# Patient Record
Sex: FEMALE | Race: WHITE | NOT HISPANIC OR LATINO | URBAN - METROPOLITAN AREA
[De-identification: names, ages, dates, MRNs, and addresses within clinical notes are randomized per-mention and may not be internally consistent; named-entity substitution may affect disease eponyms.]

---

## 2024-09-28 ENCOUNTER — OFFICE VISIT (OUTPATIENT)
Dept: URGENT CARE | Age: 24
End: 2024-09-28

## 2024-09-28 VITALS
TEMPERATURE: 98.5 F | DIASTOLIC BLOOD PRESSURE: 71 MMHG | SYSTOLIC BLOOD PRESSURE: 98 MMHG | RESPIRATION RATE: 18 BRPM | HEART RATE: 105 BPM | WEIGHT: 170 LBS | OXYGEN SATURATION: 98 %

## 2024-09-28 DIAGNOSIS — H60.501 ACUTE OTITIS EXTERNA OF RIGHT EAR, UNSPECIFIED TYPE: Primary | ICD-10-CM

## 2024-09-28 RX ORDER — DEXTROAMPHETAMINE SACCHARATE, AMPHETAMINE ASPARTATE MONOHYDRATE, DEXTROAMPHETAMINE SULFATE AND AMPHETAMINE SULFATE 2.5; 2.5; 2.5; 2.5 MG/1; MG/1; MG/1; MG/1
10-20 CAPSULE, EXTENDED RELEASE ORAL
COMMUNITY
Start: 2024-07-25

## 2024-09-28 RX ORDER — ONDANSETRON 8 MG/1
8 TABLET, ORALLY DISINTEGRATING ORAL ONCE
Status: COMPLETED | OUTPATIENT
Start: 2024-09-28 | End: 2024-09-28

## 2024-09-28 RX ORDER — TRAZODONE HYDROCHLORIDE 100 MG/1
TABLET ORAL
COMMUNITY

## 2024-09-28 RX ORDER — SUMATRIPTAN SUCCINATE 100 MG/1
TABLET ORAL
COMMUNITY
Start: 2018-01-30

## 2024-09-28 RX ORDER — KETOROLAC TROMETHAMINE 30 MG/ML
30 INJECTION, SOLUTION INTRAMUSCULAR; INTRAVENOUS ONCE
Status: COMPLETED | OUTPATIENT
Start: 2024-09-28 | End: 2024-09-28

## 2024-09-28 RX ORDER — GABAPENTIN 300 MG/1
CAPSULE ORAL
COMMUNITY

## 2024-09-28 RX ORDER — DULOXETIN HYDROCHLORIDE 30 MG/1
1 CAPSULE, DELAYED RELEASE ORAL
COMMUNITY
Start: 2024-01-21

## 2024-09-28 NOTE — PROGRESS NOTES
Subjective   Patient ID: Peggy Dozier is a 24 y.o. female. They present today with a chief complaint of Earache (Right ear).    History of Present Illness  HPI  Patient is a 24-year-old female who presents with an earache.  She has had an earache on the right side for several week.  She was put on eardrops and antibiotics yesterday.  On day of eardrops she now has worsening pain swelling so anxiety symptoms that are a side effect.  She is nauseated.  Mom brought her in To be seen.  Past Medical History  Allergies as of 09/28/2024 - never reviewed   Allergen Reaction Noted    Sulfamethoxazole-trimethoprim Hives and Rash 02/05/2018       (Not in a hospital admission)       Past Medical History:   Diagnosis Date    Acute suppurative otitis media without spontaneous rupture of ear drum, left ear 09/20/2016    Acute exudative otitis media, left    Anxiety disorder, unspecified     Anxiety    Encounter for insertion of intrauterine contraceptive device 02/25/2019    Encounter for insertion of mirena IUD    Encounter for other general counseling and advice on contraception 01/15/2019    Birth control counseling    Encounter for screening for infections with a predominantly sexual mode of transmission 03/28/2018    Routine screening for STI (sexually transmitted infection)    Encounter for surveillance of implantable subdermal contraceptive 02/25/2019    Implanon removal    Other conditions influencing health status     Nulliparity    Personal history of other diseases of the nervous system and sense organs     History of hearing loss    Personal history of other mental and behavioral disorders     History of attention deficit disorder    Superficial mycosis, unspecified 10/13/2016    Otitis externa, fungal, left ear       Past Surgical History:   Procedure Laterality Date    OTHER SURGICAL HISTORY  01/15/2019    Tonsillectomy    TONSILLECTOMY  03/31/2016    Tonsillectomy            Review of Systems  Review of Systems  Gen:  No fatigue, fever, sweats.  Head: No headache, trauma.  Eyes: No vision loss, double vision, drainage, eye pain.  ENT: No hearing changes, + ear pain, epistaxis, congestion  Cardiac: No chest pain  Pulmonary: No shortness of breath,  pleuritic pain,   Heme/lymph: No swollen glands  GI: No abdominal pain, +nausea, no vomiting, diarrhea  : No  dysuria, frequency, urgency, hematuria  Musculoskeletal: No limb pain, joint pain, back pain, joint swelling or stiffness.  Skin: No rashes, pruritus, lumps, lesions.  Neuro: No Numbness, tingling, or weakness.  Psych: No  anxiety     Review of systems is otherwise negative unless stated above or in history of present illness.                             Objective    Vitals:    09/28/24 0845   BP: 98/71   Pulse: 105   Resp: 18   Temp: 36.9 °C (98.5 °F)   SpO2: 98%   Weight: 77.1 kg (170 lb)     No LMP recorded.    Physical Exam  General: Vital signs stable, Pt is alert, no acute distress  Eyes: Conjunctiva normal, PERRL, EOMs intact  HENMT: Normocephalic, atraumatic, nose normal, no scars or masses.  No mastoid tenderness. Trachea is midline. No meningeal signs, negative Kernig and Brudzinski, moves neck freely.  No sinus tenderness + ear canal swelling. TM wnl  Resp: Respiratory effort is normal, no retractions, no stridor. Lungs CTA, no wheezes or rhonchi  CV: Heart is regular rate and rhythm.   Skin: No evidence of trauma, skin is warm and dry. No rashes, lesions or ulcers.  Skel: full range of motion of upper and lower extremities.   Neuro: Normal gait, CN II-XII intact, no motor or sensory changes.  Psych: Alert and oriented ×3, judgment is appropriate, anxious crying  Procedures    Point of Care Test & Imaging Results from this visit  No results found for this visit on 09/28/24.   No results found.    Diagnostic study results (if any) were reviewed by LANETTE Li-CNP.    Assessment/Plan   Allergies, medications, history, and pertinent labs/EKGs/Imaging  reviewed by GLENROY Li.     Medical Decision Making  Based on history and physical exam, findings consistent with uncomplicated otitis externa. No evidence of foreign body, obstruction, deep tissue infection, mastoiditis. Antibiotic ear drops prescribed. Encouraged continuation of symptomatic and supportive care measures.  She will stop the ear drops and continue on abx.  ENT follow up was given.  Pt. verbalized understanding and agreeable with plan.   She was somewhat better after zofran and toradol in the clinic      Orders and Diagnoses  There are no diagnoses linked to this encounter.    Medical Admin Record      Patient disposition: Home    Electronically signed by GLENROY Li  8:55 AM

## 2024-09-28 NOTE — PATIENT INSTRUCTIONS
Continue on the abx.  Use tylenol and motrin alternating for pain every 4 hours.  Warm compresses for pain.  Stop the drops that might be making it worse.